# Patient Record
Sex: MALE | Race: WHITE | NOT HISPANIC OR LATINO | Employment: FULL TIME | URBAN - METROPOLITAN AREA
[De-identification: names, ages, dates, MRNs, and addresses within clinical notes are randomized per-mention and may not be internally consistent; named-entity substitution may affect disease eponyms.]

---

## 2017-01-09 ENCOUNTER — OFFICE VISIT (OUTPATIENT)
Dept: FAMILY MEDICINE CLINIC | Facility: CLINIC | Age: 32
End: 2017-01-09

## 2017-01-09 VITALS
HEART RATE: 95 BPM | TEMPERATURE: 97.9 F | OXYGEN SATURATION: 98 % | DIASTOLIC BLOOD PRESSURE: 86 MMHG | HEIGHT: 74 IN | BODY MASS INDEX: 40.43 KG/M2 | SYSTOLIC BLOOD PRESSURE: 122 MMHG | WEIGHT: 315 LBS

## 2017-01-09 DIAGNOSIS — L74.510 HYPERHIDROSIS OF AXILLA: Primary | ICD-10-CM

## 2017-01-09 PROCEDURE — 99213 OFFICE O/P EST LOW 20 MIN: CPT | Performed by: NURSE PRACTITIONER

## 2017-01-09 RX ORDER — OMEPRAZOLE 40 MG/1
40 CAPSULE, DELAYED RELEASE ORAL DAILY
COMMUNITY

## 2017-01-09 NOTE — MR AVS SNAPSHOT
Reg Brito   2017 8:15 AM   Office Visit    Provider:  PAULINO Woodson   Department:  [facility]   Dept Phone:  [phone]                Your Full Care Plan              Your Updated Medication List          This list is accurate as of: 17  8:32 AM.  Always use your most recent med list.                fexofenadine 180 MG tablet   Commonly known as:  ALLEGRA       ibuprofen 800 MG tablet   Commonly known as:  ADVIL,MOTRIN   Take 1 tablet by mouth every 6 (six) hours as needed for mild pain (1-3) or moderate pain (4-6).       MULTI VITAMIN DAILY PO       omeprazole 40 MG capsule   Commonly known as:  priLOSEC               We Performed the Following     Ambulatory Referral to Dermatology       You Were Diagnosed With        Codes Comments    Hyperhidrosis of axilla    -  Primary ICD-10-CM: L74.510  ICD-9-CM: 705.21     Pap smear, as part of routine gynecological examination     ICD-10-CM: Z01.419  ICD-9-CM: V76.2       Instructions     None    Patient Instructions History      [employer] Signup     [facility] allows you to send messages to your doctor, view your test results, renew your prescriptions, schedule appointments, and more. To sign up, go to [employer]etAnywhere and click on the Sign Up Now link in the New User? box. Enter your [facility] Activation Code exactly as it appears below along with the last four digits of your Social Security Number and your Date of Birth () to complete the sign-up process. If you do not sign up before the expiration date, you must request a new code.    [facility] Activation Code: [email or web address]  Expires: 2017  8:32 AM    If you have questions, you can email [email or web address] or call [phone] to talk to our [facility] staff. Remember, [facility] is NOT to be used for urgent needs. For medical emergencies, dial 911.               Other Info from Your Visit            "  Allergies     No Known Allergies      Reason for Visit     Skin Problem under Rt arm pit, derodorant not working, for some time      Vital Signs     Blood Pressure Pulse Temperature Height Weight Oxygen Saturation    122/86 95 97.9 °F (36.6 °C) 74\" (188 cm) 341 lb (155 kg) 98%    Body Mass Index Smoking Status                43.78 kg/m2 Never Smoker          Problems and Diagnoses Noted     Sweaty armpits    -  Primary    Screening for cervical cancer          "

## 2017-01-09 NOTE — PROGRESS NOTES
Subjective   Reg Brito is a 31 y.o. male.     History of Present Illness   Reg Brito 31 y.o. male presents for evaluation of irritation to right axillary area and complaint of excessive sweating to bilateral axillary areas. Patient states he has tried multiple OTC deoderants but has not found anything that controls his sweating. He reports that even with clinical strength products, he notices sweating within a couple of hours and odor.  He reports he has felt as if his right axillary area has been irritated for a couple of weeks but denies rash or pruritus.  He does not see dermatology but would be willing to.     The following portions of the patient's history were reviewed and updated as appropriate: allergies, current medications, past family history, past medical history, past social history, past surgical history and problem list.    Review of Systems   Skin: Negative for rash.       Objective   Physical Exam   Constitutional: He is oriented to person, place, and time. He appears well-developed and well-nourished.   HENT:   Head: Normocephalic and atraumatic.   Pulmonary/Chest: Effort normal.   Neurological: He is alert and oriented to person, place, and time.   Skin: Skin is warm, dry and intact. No rash noted.   Psychiatric: He has a normal mood and affect. Judgment normal.   Vitals reviewed.      Assessment/Plan   Reg was seen today for skin problem.    Diagnoses and all orders for this visit:    Hyperhidrosis of axilla  -     Ambulatory Referral to Dermatology

## 2018-02-19 ENCOUNTER — OFFICE VISIT (OUTPATIENT)
Dept: FAMILY MEDICINE CLINIC | Facility: CLINIC | Age: 33
End: 2018-02-19

## 2018-02-19 VITALS
WEIGHT: 315 LBS | OXYGEN SATURATION: 96 % | HEART RATE: 97 BPM | TEMPERATURE: 98.2 F | DIASTOLIC BLOOD PRESSURE: 80 MMHG | HEIGHT: 74 IN | RESPIRATION RATE: 18 BRPM | SYSTOLIC BLOOD PRESSURE: 132 MMHG | BODY MASS INDEX: 40.43 KG/M2

## 2018-02-19 DIAGNOSIS — H66.91 RIGHT OTITIS MEDIA, UNSPECIFIED OTITIS MEDIA TYPE: Primary | ICD-10-CM

## 2018-02-19 PROBLEM — H81.02 ENDOLYMPHATIC HYDROPS OF LEFT EAR: Status: ACTIVE | Noted: 2018-02-19

## 2018-02-19 PROCEDURE — 99213 OFFICE O/P EST LOW 20 MIN: CPT | Performed by: NURSE PRACTITIONER

## 2018-02-19 RX ORDER — CETIRIZINE HYDROCHLORIDE 10 MG/1
10 TABLET ORAL DAILY
COMMUNITY
Start: 2018-02-19

## 2018-02-19 RX ORDER — AMOXICILLIN 500 MG/1
500 CAPSULE ORAL 2 TIMES DAILY
Qty: 20 CAPSULE | Refills: 0 | Status: SHIPPED | OUTPATIENT
Start: 2018-02-19 | End: 2018-04-01

## 2018-02-19 RX ORDER — FLUTICASONE PROPIONATE 50 MCG
2 SPRAY, SUSPENSION (ML) NASAL DAILY
Qty: 1 BOTTLE | Refills: 11 | Status: SHIPPED | OUTPATIENT
Start: 2018-02-19 | End: 2018-06-08

## 2018-02-19 NOTE — PROGRESS NOTES
Subjective   Reg Brito is a 32 y.o. male.     History of Present Illness   Reg Brito 32 y.o. male who presents for evaluation of ear pressure. Symptoms include ear pressure and ear pain.  Onset of symptoms was 1 week ago, gradually worsening since that time. Patient denies shortness of breath, wheezing, fever.   Evaluation to date: none Treatment to date:  Allegra.  Reports having uri which has resolved but ear pressure and pain have worsened. Mainly on left side.     The following portions of the patient's history were reviewed and updated as appropriate: allergies, current medications, past family history, past medical history, past social history, past surgical history and problem list.    Review of Systems   Constitutional: Negative for chills and fever.   HENT: Positive for ear pain. Negative for congestion, ear discharge, postnasal drip, sinus pain and sinus pressure.    Respiratory: Negative for cough.        Objective   Physical Exam   Constitutional: He is oriented to person, place, and time. He appears well-developed and well-nourished.   HENT:   Right Ear: Tympanic membrane is erythematous.   Left Ear: External ear and ear canal normal. Tympanic membrane is bulging.   Pulmonary/Chest: Effort normal.   Neurological: He is oriented to person, place, and time.   Skin: Skin is warm and dry.   Psychiatric: He has a normal mood and affect. His behavior is normal. Judgment and thought content normal.   Nursing note and vitals reviewed.      Assessment/Plan   Reg was seen today for earache.    Diagnoses and all orders for this visit:    Right otitis media, unspecified otitis media type  -     amoxicillin (AMOXIL) 500 MG capsule; Take 1 capsule by mouth 2 (Two) Times a Day.    Other orders  -     fluticasone (FLONASE) 50 MCG/ACT nasal spray; 2 sprays into each nostril Daily.

## 2018-06-08 ENCOUNTER — OFFICE VISIT (OUTPATIENT)
Dept: FAMILY MEDICINE CLINIC | Facility: CLINIC | Age: 33
End: 2018-06-08

## 2018-06-08 VITALS
SYSTOLIC BLOOD PRESSURE: 131 MMHG | TEMPERATURE: 98.1 F | WEIGHT: 243 LBS | BODY MASS INDEX: 31.18 KG/M2 | DIASTOLIC BLOOD PRESSURE: 90 MMHG | HEART RATE: 92 BPM | RESPIRATION RATE: 18 BRPM | HEIGHT: 74 IN | OXYGEN SATURATION: 97 %

## 2018-06-08 DIAGNOSIS — L30.9 ECZEMA, UNSPECIFIED TYPE: Primary | ICD-10-CM

## 2018-06-08 PROCEDURE — 99213 OFFICE O/P EST LOW 20 MIN: CPT | Performed by: NURSE PRACTITIONER

## 2018-06-08 NOTE — PROGRESS NOTES
Subjective   Reg Brito is a 33 y.o. male.     History of Present Illness   Reg Brito 33 y.o. male presents for evaluation of a rash involving the left hand. Rash has been present for: a few months. Lesions are erythematous, and are described as patches. Rash has not changed over time. Rash itches. Associated symptoms  .none.  Patient denies:new rash areas..  Treatment to date includes: OTC Hydrocortisone cream and OTC triple antibiotic cream without improvement. Symptoms are stable. Patient has not had contacts with similar rash. Patient has not had new exposures (soaps, lotions, laundry detergents, foods, medications, plants, insects or animals).  States he normally gets similar areas on his hands in the winter months but it has not improved this time.     The following portions of the patient's history were reviewed and updated as appropriate: allergies, current medications, past family history, past medical history, past social history, past surgical history and problem list.    Review of Systems   Constitutional: Negative for chills, fatigue and fever.   Respiratory: Negative for cough and shortness of breath.    Cardiovascular: Negative for chest pain and palpitations.   Skin: Positive for rash.   Psychiatric/Behavioral: Negative for dysphoric mood and sleep disturbance. The patient is not nervous/anxious.        Objective   Physical Exam   Constitutional: He is oriented to person, place, and time. He appears well-developed and well-nourished.   Pulmonary/Chest: Effort normal.   Neurological: He is oriented to person, place, and time.   Skin: Skin is warm and dry. Rash noted. Rash is papular. There is erythema.        Scaly, papular eruption to left 3rd MCP area   Psychiatric: He has a normal mood and affect. His behavior is normal. Judgment and thought content normal.   Nursing note and vitals reviewed.      Assessment/Plan   Reg was seen today for rash.    Diagnoses and all orders for this  visit:    Eczema, unspecified type             Gave sample of eucrisa with instructions for use. Pt to call if he wants RX sent in. Given copay card

## 2018-06-29 ENCOUNTER — OFFICE VISIT (OUTPATIENT)
Dept: FAMILY MEDICINE CLINIC | Facility: CLINIC | Age: 33
End: 2018-06-29

## 2018-06-29 VITALS
BODY MASS INDEX: 40.43 KG/M2 | DIASTOLIC BLOOD PRESSURE: 86 MMHG | OXYGEN SATURATION: 97 % | SYSTOLIC BLOOD PRESSURE: 128 MMHG | HEART RATE: 83 BPM | HEIGHT: 74 IN | WEIGHT: 315 LBS | RESPIRATION RATE: 18 BRPM | TEMPERATURE: 98 F

## 2018-06-29 DIAGNOSIS — L30.9 ECZEMA, UNSPECIFIED TYPE: Primary | ICD-10-CM

## 2018-06-29 DIAGNOSIS — K52.9 GASTROENTERITIS: ICD-10-CM

## 2018-06-29 PROCEDURE — 99213 OFFICE O/P EST LOW 20 MIN: CPT | Performed by: NURSE PRACTITIONER

## 2018-06-29 NOTE — PROGRESS NOTES
Subjective   Reg Brito is a 33 y.o. male.     History of Present Illness   Reg Brito 33 y.o. male who presents for evaluation of diarrhea and reports having 1-2 stools in the last 24 hours. Symptoms have been present for 2 weeks .  The condition is aggravated by eating any food . he is experiencing fever  and chills.  Alleviating factors are not eating with some help, but still symptoms . Patient denies dyschezia, melena and bright red blood in stool, nausea or vomiting. his past medical history is notable for GERD and gastroenteritis a couple of months ago.  Patient denies recent travel.  Had fever of 103 for first couple of days.     Patient is very concerned about hepatitis A.      States eucrisa helped with eczema and would like RX.   The following portions of the patient's history were reviewed and updated as appropriate: allergies, current medications, past family history, past medical history, past social history, past surgical history and problem list.    Review of Systems   Constitutional: Negative for chills and fever.   Gastrointestinal: Positive for abdominal pain and diarrhea. Negative for abdominal distention, anal bleeding, blood in stool, constipation, nausea, rectal pain and vomiting.       Objective   Physical Exam   Constitutional: He is oriented to person, place, and time. He appears well-developed and well-nourished.   Pulmonary/Chest: Effort normal.   Abdominal: Normal appearance and bowel sounds are normal. There is no hepatosplenomegaly. There is no tenderness.   Neurological: He is oriented to person, place, and time.   Skin: Skin is warm and dry.   Psychiatric: He has a normal mood and affect. His behavior is normal. Judgment and thought content normal.   Nursing note and vitals reviewed.      Assessment/Plan   Reg was seen today for gi problem and diarrhea.    Diagnoses and all orders for this visit:    Eczema, unspecified type  -     Crisaborole 2 % ointment; Apply 1  application topically 2 (Two) Times a Day.    Gastroenteritis  -     Cancel: Hepatitis A Vaccine Adult IM  -     Hepatitis A Antibody, IgM          Discussed with patient that it was unlikely to be hepatitis A as symptoms are nearly resolved.  He would like to get checked before he gets the vaccine.

## 2018-06-30 LAB — HAV IGM SERPL QL IA: NEGATIVE

## 2018-07-11 ENCOUNTER — CLINICAL SUPPORT (OUTPATIENT)
Dept: FAMILY MEDICINE CLINIC | Facility: CLINIC | Age: 33
End: 2018-07-11

## 2018-07-11 DIAGNOSIS — Z23 IMMUNIZATION DUE: Primary | ICD-10-CM

## 2018-07-11 PROCEDURE — 90471 IMMUNIZATION ADMIN: CPT | Performed by: FAMILY MEDICINE

## 2018-07-11 PROCEDURE — 90632 HEPA VACCINE ADULT IM: CPT | Performed by: FAMILY MEDICINE

## 2022-05-19 ENCOUNTER — OFFICE (OUTPATIENT)
Dept: URBAN - METROPOLITAN AREA CLINIC 75 | Facility: CLINIC | Age: 37
End: 2022-05-19

## 2022-05-19 VITALS
DIASTOLIC BLOOD PRESSURE: 92 MMHG | SYSTOLIC BLOOD PRESSURE: 136 MMHG | HEIGHT: 74 IN | HEART RATE: 92 BPM | OXYGEN SATURATION: 99 % | WEIGHT: 315 LBS | TEMPERATURE: 96.7 F

## 2022-05-19 DIAGNOSIS — K62.5 HEMORRHAGE OF ANUS AND RECTUM: ICD-10-CM

## 2022-05-19 DIAGNOSIS — K64.9 UNSPECIFIED HEMORRHOIDS: ICD-10-CM

## 2022-05-19 DIAGNOSIS — K21.9 GASTRO-ESOPHAGEAL REFLUX DISEASE WITHOUT ESOPHAGITIS: ICD-10-CM

## 2022-05-19 PROCEDURE — 99204 OFFICE O/P NEW MOD 45 MIN: CPT | Performed by: INTERNAL MEDICINE

## 2022-05-19 RX ORDER — HYDROCORTISONE 25 MG/G
OINTMENT TOPICAL
Qty: 30 | Refills: 3 | Status: ACTIVE
Start: 2022-05-19

## 2022-05-19 RX ORDER — HYDROCORTISONE ACETATE 25 MG/1
SUPPOSITORY RECTAL
Qty: 15 | Refills: 2 | Status: ACTIVE
Start: 2022-05-19

## 2024-04-02 ENCOUNTER — OFFICE VISIT (OUTPATIENT)
Dept: FAMILY MEDICINE CLINIC | Facility: CLINIC | Age: 39
End: 2024-04-02
Payer: COMMERCIAL

## 2024-04-02 VITALS
DIASTOLIC BLOOD PRESSURE: 98 MMHG | HEIGHT: 74 IN | SYSTOLIC BLOOD PRESSURE: 142 MMHG | BODY MASS INDEX: 40.43 KG/M2 | OXYGEN SATURATION: 97 % | WEIGHT: 315 LBS | HEART RATE: 102 BPM

## 2024-04-02 DIAGNOSIS — Z00.00 ANNUAL PHYSICAL EXAM: Primary | ICD-10-CM

## 2024-04-02 DIAGNOSIS — K21.9 GASTROESOPHAGEAL REFLUX DISEASE WITHOUT ESOPHAGITIS: Chronic | ICD-10-CM

## 2024-04-02 DIAGNOSIS — Z87.2 HISTORY OF CELLULITIS: ICD-10-CM

## 2024-04-02 DIAGNOSIS — I10 PRIMARY HYPERTENSION: Chronic | ICD-10-CM

## 2024-04-02 DIAGNOSIS — Z13.220 SCREENING FOR LIPID DISORDERS: ICD-10-CM

## 2024-04-02 DIAGNOSIS — J30.89 ENVIRONMENTAL AND SEASONAL ALLERGIES: Chronic | ICD-10-CM

## 2024-04-02 DIAGNOSIS — Z11.59 ENCOUNTER FOR HEPATITIS C SCREENING TEST FOR LOW RISK PATIENT: ICD-10-CM

## 2024-04-02 DIAGNOSIS — E66.01 MORBID (SEVERE) OBESITY DUE TO EXCESS CALORIES: Chronic | ICD-10-CM

## 2024-04-02 DIAGNOSIS — Z13.1 SCREENING FOR DIABETES MELLITUS (DM): ICD-10-CM

## 2024-04-02 DIAGNOSIS — B00.50 HERPES SIMPLEX OF EYE: Chronic | ICD-10-CM

## 2024-04-02 DIAGNOSIS — Z13.21 ENCOUNTER FOR VITAMIN DEFICIENCY SCREENING: ICD-10-CM

## 2024-04-02 DIAGNOSIS — H61.23 BILATERAL IMPACTED CERUMEN: ICD-10-CM

## 2024-04-02 RX ORDER — LISINOPRIL 10 MG/1
10 TABLET ORAL DAILY
Qty: 30 TABLET | Refills: 1 | Status: SHIPPED | OUTPATIENT
Start: 2024-04-02

## 2024-04-02 RX ORDER — ACYCLOVIR 400 MG/1
400 TABLET ORAL
COMMUNITY

## 2024-04-02 RX ORDER — FEXOFENADINE HCL 180 MG/1
180 TABLET ORAL DAILY
COMMUNITY

## 2024-04-02 NOTE — PROGRESS NOTES
Male Physical Note      Date: 2024   Patient Name: Reg Brito  : 1985   MRN: [id number]     Chief Complaint   Patient presents with    Annual Exam     Pt is here today for annual exam along with blood work     Elevated Blood Pressure     Pt states in the past year he's noticed his blood pressure has started to be elevated, Pt states this is possibly due to lack of fitness    Leg Pain     Pt states he has noticed discoloration on both of his legs       History of Present Illness: Reg Brito is a 38 y.o. male who is here today for their annual health maintenance and physical.      Subjective      Review of Systems    Past Medical History, Social History, Family History and Care Team were all reviewed with patient and updated as appropriate.       Current Outpatient Medications:     acyclovir (ZOVIRAX) 400 MG tablet, Take 1 tablet by mouth Every 4 (Four) Hours While Awake. Take no more than 5 doses a day., Disp: , Rfl:     esomeprazole (nexIUM) 20 MG capsule, Take 1 capsule by mouth Every Morning Before Breakfast., Disp: , Rfl:     fexofenadine (ALLEGRA) 180 MG tablet, Take 1 tablet by mouth Daily., Disp: , Rfl:     Multiple Vitamin (MULTI VITAMIN DAILY PO), Take  by mouth., Disp: , Rfl:     lisinopril (PRINIVIL,ZESTRIL) 10 MG tablet, Take 1 tablet by mouth Daily., Disp: 30 tablet, Rfl: 1    Allergies   Allergen Reactions    Nuts Other (See Comments)     Tree nuts via allergy testing, was not exposed, has always avoided    Shellfish-Derived Products Nausea And Vomiting     Allergy testing confirmed allergy       Immunization History   Administered Date(s) Administered    Hepatitis A 2018, 2019    Tdap 2016        Health Maintenance Summary            Ordered - HEPATITIS C SCREENING (Once) Ordered on 2024      No completion, postpone, or frequency change history exists for this topic.              Overdue - COVID-19 Vaccine (2023- season) Never done      No  "completion, postpone, or frequency change history exists for this topic.              INFLUENZA VACCINE (Yearly - August to March) Next due on 8/1/2024 02/19/2018  Declined    01/09/2017  Declined              ANNUAL PHYSICAL (Yearly) Next due on 4/2/2025 04/02/2024  Done    06/29/2018  Postponed until 12/31/2018 by Milagro Boyer MA (Pending event)              BMI FOLLOWUP (Yearly) Next due on 4/2/2025 04/02/2024  [employer]: WORKFLOW - QUALITY MEASUREMENT - DOCUMENTED WEIGHT FOLLOW-UP PLAN              TDAP/TD VACCINES (2 - Td or Tdap) Next due on 5/13/2026 05/13/2016  Imm Admin: Tdap              Pneumococcal Vaccine 0-64 (Series Information) Aged Out      No completion, postpone, or frequency change history exists for this topic.                    Colorectal Screening:   never, negative family hx   Last Completed Colonoscopy       This patient has no relevant Health Maintenance data.          Hep C (Age 18-79 once):  screening lab drawn    A1c: No results found for: \"HGBA1C\"  Lipid panel: No results found for: \"LIPIDEXCLUSI\"    The ASCVD Risk score (Thomas DK, et al., 2019) failed to calculate for the following reasons:    The 2019 ASCVD risk score is only valid for ages 40 to 79    Dermatology: does not see regularly, no concerns/complaints  Ophthalmologist: sees irregularly, typically only when he needs glasses  Dentist: twice yearly exams and cleanings    Tobacco Use: Low Risk  (4/2/2024)    Patient History     Smoking Tobacco Use: Never     Smokeless Tobacco Use: Never     Passive Exposure: Not on file       Social History     Substance and Sexual Activity   Alcohol Use No        Social History     Substance and Sexual Activity   Drug Use No        Diet/Physical activity: patient report very unhealthy diet over the past few years, but within the past 2-3 months he is making healthier choices. Fast food 2-3 times per week, soft drink maybe once a week. Reports he does have a sweet " "tooth but he is working on it. Has frequented the gym for regular workouts but after his twins were born (16 months ago) he hasn't been going to the gym    Sexual health:  Social History     Substance and Sexual Activity   Sexual Activity Yes    Partners: Female       PHQ-2 Depression Screening  PHQ-9 Total Score: 0       Intimate partner violence: (Screen on initial visit, older adult with injury or evidence of neglect):  Violence can be a problem in many people's lives, so I now ask every patient about trauma or abuse they may have experienced in a relationship.  Stress/Safety - Do you feel safe in your relationship?  Afraid/Abused - Have you ever been in a relationship where you were threatened, hurt, or afraid?  Friend/Family - Are your friends aware you have been hurt?  Emergency Plan - Do you have a safe place to go and the resources you need in an emergency?    Osteoporosis:   Men: history of low trauma fracture, androgen deprivation therapy for prostate cancer, hypogonadism, primary hyperparathyroidism, intestinal disorders.     Objective     Physical Exam:  Vitals:    04/02/24 1513   BP: 142/98   BP Location: Left arm   Patient Position: Sitting   Cuff Size: Adult   Pulse: 102   SpO2: 97%   Weight: (!) 182 kg (400 lb 9.6 oz)   Height: 188 cm (74\")     Body mass index is 51.43 kg/m².     Physical Exam  Vitals reviewed.   Constitutional:       Appearance: Normal appearance. He is obese.   HENT:      Head: Normocephalic.      Right Ear: External ear normal. There is impacted cerumen.      Left Ear: External ear normal. There is impacted cerumen.      Nose: Nose normal.      Mouth/Throat:      Mouth: Mucous membranes are moist.      Pharynx: Oropharynx is clear.   Eyes:      Extraocular Movements: Extraocular movements intact.      Pupils: Pupils are equal, round, and reactive to light.   Cardiovascular:      Rate and Rhythm: Normal rate and regular rhythm.      Heart sounds: Normal heart sounds.   Pulmonary: "      Effort: Pulmonary effort is normal.      Breath sounds: Normal breath sounds.   Musculoskeletal:         General: Normal range of motion.      Cervical back: Normal range of motion.      Right lower leg: Edema (mild) present.      Left lower leg: Edema (mild) present.   Skin:     General: Skin is warm and dry.      Capillary Refill: Capillary refill takes less than 2 seconds.   Neurological:      General: No focal deficit present.      Mental Status: He is alert and oriented to person, place, and time.   Psychiatric:         Mood and Affect: Mood normal.         Behavior: Behavior normal.         Procedures    Assessment / Plan      Assessment/Plan:   Diagnoses and all orders for this visit:    1. Annual physical exam (Primary)  Assessment & Plan:  Overall doing well   with 16-month-old twin boys  Wife is currently pregnant, due in October - unknown gender  Works from home, IT support    Orders:  -     CBC & Differential  -     Comprehensive Metabolic Panel  -     Hemoglobin A1c  -     Lipid Panel  -     TSH  -     Vitamin B12  -     Vitamin D,25-Hydroxy  -     Folate  -     T4, free    2. History of cellulitis  Assessment & Plan:  Patient reports he was diagnosed with bilateral lower leg cellulitis in December 2023. He was prescribed oral antibiotics (Amoxicillin, he thinks) which resolved the infection. He has not had recurrence of infection since then, but does note lower leg swelling and discomfort at the end of the day - we discussed dependent edema and venous stasis as possible causes. Patient declines further evaluation at this time, is agreeable to wearing compression stockings while sitting at his desk for work.         3. Primary hypertension  Assessment & Plan:  Patient has new onset Hypertension  Ambulatory BP monitoring  Medication changes per orders.  Dietary sodium restriction.  Recommended strategies for weight loss.  Counseled on importance of healthy eating and regular aerobic  exercise  Advised to check BP regularly and call office if frequently >140/90  Blood pressure will be reassessed in 4 weeks.    Discussed medication options, risks/benefits, and possible side effects. Will initiate Lisinopril today, monitor BP at home, return in 3-4 weeks for recheck.     Orders:  -     lisinopril (PRINIVIL,ZESTRIL) 10 MG tablet; Take 1 tablet by mouth Daily.  Dispense: 30 tablet; Refill: 1    4. Gastroesophageal reflux disease without esophagitis  Assessment & Plan:  Stable, takes Esomeprazole daily      5. Environmental and seasonal allergies  Assessment & Plan:  Stable, takes Allegra daily      6. Herpes simplex of eye  Assessment & Plan:  Patient reports this was diagnosed by ophthalmologist years ago, he takes 400mg Acyclovir as needed when he has a flare of symptoms.       7. Screening for lipid disorders  Assessment & Plan:  Labs drawn today, not fasting    Orders:  -     Lipid Panel    8. Screening for diabetes mellitus (DM)  Assessment & Plan:  Labs drawn today    Orders:  -     Comprehensive Metabolic Panel  -     Hemoglobin A1c    9. Encounter for vitamin deficiency screening  Assessment & Plan:  Labs drawn today    Orders:  -     Vitamin B12  -     Vitamin D,25-Hydroxy  -     Folate    10. Encounter for hepatitis C screening test for low risk patient  Assessment & Plan:  Labs drawn today    Orders:  -     Hepatitis C Antibody    11. Bilateral impacted cerumen  Assessment & Plan:  Patient reports he has been told he has an overabundance of ear wax. He has been using Debrox intermittently without improvement. He does use Qtips. Discussed and encouraged regular use of Debrox or sweet oil into the ears to soften the wax, will perform cerumen removal at next appointment      12. Morbid (severe) obesity due to excess calories         Vaccine Counseling:      Healthcare Maintenance:  Counseling provided based on age appropriate USPSTF guidelines.  Class 3 Severe Obesity (BMI >=40).  Obesity-related health conditions include the following: hypertension and screening labs drawn today to assess for other conditions . Obesity is unchanged. BMI is is above average; BMI management plan is completed. We discussed portion control and increasing exercise.      Advanced Care Planning:   Patient does not have an advance directive, declines further assistance.    Smoking Cessation:   Not a smoker    Reg Brito voices understanding and acceptance of this advice and will call back with any further questions or concerns. AVS with preventive healthcare tips printed for patient.     Follow Up:   Return in about 4 weeks (around 4/30/2024) for Recheck.        PAULINO Ribera

## 2024-04-02 NOTE — ASSESSMENT & PLAN NOTE
Patient reports this was diagnosed by ophthalmologist years ago, he takes 400mg Acyclovir as needed when he has a flare of symptoms.

## 2024-04-02 NOTE — ASSESSMENT & PLAN NOTE
Patient has new onset Hypertension  Ambulatory BP monitoring  Medication changes per orders.  Dietary sodium restriction.  Recommended strategies for weight loss.  Counseled on importance of healthy eating and regular aerobic exercise  Advised to check BP regularly and call office if frequently >140/90  Blood pressure will be reassessed in 4 weeks.    Discussed medication options, risks/benefits, and possible side effects. Will initiate Lisinopril today, monitor BP at home, return in 3-4 weeks for recheck.

## 2024-04-02 NOTE — ASSESSMENT & PLAN NOTE
Patient reports he was diagnosed with bilateral lower leg cellulitis in December 2023. He was prescribed oral antibiotics (Amoxicillin, he thinks) which resolved the infection. He has not had recurrence of infection since then, but does note lower leg swelling and discomfort at the end of the day - we discussed dependent edema and venous stasis as possible causes. Patient declines further evaluation at this time, is agreeable to wearing compression stockings while sitting at his desk for work.

## 2024-04-02 NOTE — ASSESSMENT & PLAN NOTE
Overall doing well   with 16-month-old twin boys  Wife is currently pregnant, due in October - unknown gender  Works from home, IT support

## 2024-04-02 NOTE — ASSESSMENT & PLAN NOTE
Patient reports he has been told he has an overabundance of ear wax. He has been using Debrox intermittently without improvement. He does use Qtips. Discussed and encouraged regular use of Debrox or sweet oil into the ears to soften the wax, will perform cerumen removal at next appointment

## 2024-04-02 NOTE — PATIENT INSTRUCTIONS
Obesity, Adult  Obesity is the condition of having too much total body fat. Being overweight or obese means that your weight is greater than what is considered healthy for your body size. Obesity is determined by a measurement called BMI (body mass index). BMI is an estimate of body fat and is calculated from height and weight. For adults, a BMI of 30 or higher is considered obese.  Obesity can lead to other health concerns and major illnesses, including:  Stroke.  Coronary artery disease (CAD).  Type 2 diabetes.  Some types of cancer, including cancers of the colon, breast, uterus, and gallbladder.  High blood pressure (hypertension).  High cholesterol.  Gallbladder stones.  Obesity can also contribute to:  Osteoarthritis.  Sleep apnea.  Infertility problems.  What are the causes?  Common causes of this condition include:  Eating daily meals that are high in calories, sugar, and fat.  Drinking high amounts of sugar-sweetened beverages, such as soft drinks.  Being born with genes that may make you more likely to become obese.  Having a medical condition that causes obesity, including:  Hypothyroidism.  Polycystic ovarian syndrome (PCOS).  Binge-eating disorder.  Cushing syndrome.  Taking certain medicines, such as steroids, antidepressants, and seizure medicines.  Not being physically active (sedentary lifestyle).  Not getting enough sleep.  What increases the risk?  The following factors may make you more likely to develop this condition:  Having a family history of obesity.  Living in an area with limited access to:  [place], recreation centers, or sidewalks.  Healthy food choices, such as grocery stores and [employer] markets.  What are the signs or symptoms?  The main sign of this condition is having too much body fat.  How is this diagnosed?  This condition is diagnosed based on:  Your BMI. If you are an adult with a BMI of 30 or higher, you are considered obese.  Your waist circumference. This measures the  distance around your waistline.  Your skinfold thickness. Your health care provider may gently pinch a fold of your skin and measure it.  You may have other tests to check for underlying conditions.  How is this treated?  Treatment for this condition often includes changing your lifestyle. Treatment may include some or all of the following:  Dietary changes. This may include developing a healthy meal plan.  Regular physical activity. This may include activity that causes your heart to beat faster (aerobic exercise) and strength training. Work with your health care provider to design an exercise program that works for you.  Medicine to help you lose weight if you are unable to lose one pound a week after six weeks of healthy eating and more physical activity.  Treating conditions that cause the obesity (underlying conditions).  Surgery. Surgical options may include gastric banding and gastric bypass. Surgery may be done if:  Other treatments have not helped to improve your condition.  You have a BMI of 40 or higher.  You have life-threatening health problems related to obesity.  Follow these instructions at home:  Eating and drinking    Follow recommendations from your health care provider about what you eat and drink. Your health care provider may advise you to:  Limit fast food, sweets, and processed snack foods.  Choose low-fat options, such as low-fat milk instead of whole milk.  Eat five or more servings of fruits or vegetables every day.  Choose healthy foods when you eat out.  Keep low-fat snacks available.  Limit sugary drinks, such as soda, fruit juice, sweetened iced tea, and flavored milk.  Drink enough water to keep your urine pale yellow.  Do not follow a fad diet. Fad diets can be unhealthy and even dangerous.  Other healthful choices include:  Eat at home more often. This gives you more control over what you eat.  Learn to read food labels. This will help you understand how much food is considered one  serving.  Learn what a healthy serving size is.  Physical activity  Exercise regularly, as told by your health care provider.  Most adults should get up to 150 minutes of moderate-intensity exercise every week.  Ask your health care provider what types of exercise are safe for you and how often you should exercise.  Warm up and stretch before being active.  Cool down and stretch after being active.  Rest between periods of activity.  Lifestyle  Work with your health care provider and a dietitian to set a weight-loss goal that is healthy and reasonable for you.  Limit your screen time.  Find ways to reward yourself that do not involve food.  Do not drink alcohol if:  Your health care provider tells you not to drink.  You are pregnant, may be pregnant, or are planning to become pregnant.  If you drink alcohol:  Limit how much you have to:  0-1 drink a day for women.  0-2 drinks a day for men.  Know how much alcohol is in your drink. In the [place] one drink equals one 12 oz bottle of beer (355 mL), one 5 oz glass of wine (148 mL), or one 1½ oz glass of hard liquor (44 mL).  General instructions  Keep a weight-loss journal to keep track of the food you eat and how much exercise you get.  Take over-the-counter and prescription medicines only as told by your health care provider.  Take vitamins and supplements only as told by your health care provider.  Consider joining a support group. Your health care provider may be able to recommend a support group.  Pay attention to your mental health as obesity can lead to depression or self esteem issues.  Keep all follow-up visits. This is important.  Contact a health care provider if:  You are unable to meet your weight-loss goal after six weeks of dietary and lifestyle changes.  You have trouble breathing.  Summary  Obesity is the condition of having too much total body fat.  Being overweight or obese means that your weight is greater than what is considered healthy for your body  size.  Work with your health care provider and a dietitian to set a weight-loss goal that is healthy and reasonable for you.  Exercise regularly, as told by your health care provider. Ask your health care provider what types of exercise are safe for you and how often you should exercise.  This information is not intended to replace advice given to you by your health care provider. Make sure you discuss any questions you have with your health care provider.  Document Revised: 07/26/2022 Document Reviewed: 07/26/2022  [employer] Patient Education © 2023 [employer]    Exercising to Lose Weight  Getting regular exercise is important for everyone. It is especially important if you are overweight. Being overweight increases your risk of heart disease, stroke, diabetes, high blood pressure, and several types of cancer. Exercising, and reducing the calories you consume, can help you lose weight and improve fitness and health.  Exercise can be moderate or vigorous intensity. To lose weight, most people need to do a certain amount of moderate or vigorous-intensity exercise each week.  How can exercise affect me?  You lose weight when you exercise enough to burn more calories than you eat. Exercise also reduces body fat and builds muscle. The more muscle you have, the more calories you burn. Exercise also:  Improves mood.  Reduces stress and tension.  Improves your overall fitness, flexibility, and endurance.  Increases bone strength.  Moderate-intensity exercise    Moderate-intensity exercise is any activity that gets you moving enough to burn at least three times more energy (calories) than if you were sitting.  Examples of moderate exercise include:  Walking a mile in 15 minutes.  Doing light yard work.  Biking at an easy pace.  Most people should get at least 150 minutes of moderate-intensity exercise a week to maintain their body weight.  Vigorous-intensity exercise  Vigorous-intensity exercise is any activity that gets  you moving enough to burn at least six times more calories than if you were sitting. When you exercise at this intensity, you should be working hard enough that you are not able to carry on a conversation.  Examples of vigorous exercise include:  Running.  Playing a team sport, such as football, basketball, and soccer.  Jumping rope.  Most people should get at least 75 minutes a week of vigorous exercise to maintain their body weight.  What actions can I take to lose weight?  The amount of exercise you need to lose weight depends on:  Your age.  The type of exercise.  Any health conditions you have.  Your overall physical ability.  Talk to your health care provider about how much exercise you need and what types of activities are safe for you.  Nutrition    Make changes to your diet as told by your health care provider or diet and nutrition specialist (dietitian). This may include:  Eating fewer calories.  Eating more protein.  Eating less unhealthy fats.  Eating a diet that includes fresh fruits and vegetables, whole grains, low-fat dairy products, and lean protein.  Avoiding foods with added fat, salt, and sugar.  Drink plenty of water while you exercise to prevent dehydration or heat stroke.  Activity  Choose an activity that you enjoy and set realistic goals. Your health care provider can help you make an exercise plan that works for you.  Exercise at a moderate or vigorous intensity most days of the week.  The intensity of exercise may vary from person to person. You can tell how intense a workout is for you by paying attention to your breathing and heartbeat. Most people will notice their breathing and heartbeat get faster with more intense exercise.  Do resistance training twice each week, such as:  Push-ups.  Sit-ups.  Lifting weights.  Using resistance bands.  Getting short amounts of exercise can be just as helpful as long, structured periods of exercise. If you have trouble finding time to exercise, try  doing these things as part of your daily routine:  Get up, stretch, and walk around every 30 minutes throughout the day.  Go for a walk during your lunch break.  Park your car farther away from your destination.  If you take public transportation, get off one stop early and walk the rest of the way.  Make phone calls while standing up and walking around.  Take the stairs instead of elevators or escalators.  Wear comfortable clothes and shoes with good support.  Do not exercise so much that you hurt yourself, feel dizzy, or get very short of breath.  Where to find more information  [place] Department of Health and Human Services: www.hhs.gov  Centers for Disease Control and Prevention: www.cdc.gov  Contact a health care provider:  Before starting a new exercise program.  If you have questions or concerns about your weight.  If you have a medical problem that keeps you from exercising.  Get help right away if:  You have any of the following while exercising:  Injury.  Dizziness.  Difficulty breathing or shortness of breath that does not go away when you stop exercising.  Chest pain.  Rapid heartbeat.  These symptoms may represent a serious problem that is an emergency. Do not wait to see if the symptoms will go away. Get medical help right away. Call your local emergency services (911 in the [place] Do not drive yourself to the hospital.  Summary  Getting regular exercise is especially important if you are overweight.  Being overweight increases your risk of heart disease, stroke, diabetes, high blood pressure, and several types of cancer.  Losing weight happens when you burn more calories than you eat.  Reducing the amount of calories you eat, and getting regular moderate or vigorous exercise each week, helps you lose weight.  This information is not intended to replace advice given to you by your health care provider. Make sure you discuss any questions you have with your health care provider.  Document Revised:  02/13/2022 Document Reviewed: 02/13/2022  [employer] Patient Education © 2023 [employer]

## 2024-04-03 LAB
25(OH)D3+25(OH)D2 SERPL-MCNC: 19.7 NG/ML (ref 30–100)
ALBUMIN SERPL-MCNC: 4.4 G/DL (ref 4.1–5.1)
ALBUMIN/GLOB SERPL: 1.6 {RATIO} (ref 1.2–2.2)
ALP SERPL-CCNC: 75 IU/L (ref 44–121)
ALT SERPL-CCNC: 35 IU/L (ref 0–44)
AST SERPL-CCNC: 23 IU/L (ref 0–40)
BASOPHILS # BLD AUTO: 0.1 X10E3/UL (ref 0–0.2)
BASOPHILS NFR BLD AUTO: 1 %
BILIRUB SERPL-MCNC: 0.4 MG/DL (ref 0–1.2)
BUN SERPL-MCNC: 10 MG/DL (ref 6–20)
BUN/CREAT SERPL: 10 (ref 9–20)
CALCIUM SERPL-MCNC: 9.2 MG/DL (ref 8.7–10.2)
CHLORIDE SERPL-SCNC: 102 MMOL/L (ref 96–106)
CHOLEST SERPL-MCNC: 143 MG/DL (ref 100–199)
CO2 SERPL-SCNC: 22 MMOL/L (ref 20–29)
CREAT SERPL-MCNC: 0.98 MG/DL (ref 0.76–1.27)
EGFRCR SERPLBLD CKD-EPI 2021: 102 ML/MIN/1.73
EOSINOPHIL # BLD AUTO: 0.2 X10E3/UL (ref 0–0.4)
EOSINOPHIL NFR BLD AUTO: 3 %
ERYTHROCYTE [DISTWIDTH] IN BLOOD BY AUTOMATED COUNT: 14.2 % (ref 11.6–15.4)
FOLATE SERPL-MCNC: 19.6 NG/ML
GLOBULIN SER CALC-MCNC: 2.8 G/DL (ref 1.5–4.5)
GLUCOSE SERPL-MCNC: 106 MG/DL (ref 70–99)
HBA1C MFR BLD: 6.2 % (ref 4.8–5.6)
HCT VFR BLD AUTO: 43.6 % (ref 37.5–51)
HCV IGG SERPL QL IA: NON REACTIVE
HDLC SERPL-MCNC: 23 MG/DL
HGB BLD-MCNC: 14.1 G/DL (ref 13–17.7)
IMM GRANULOCYTES # BLD AUTO: 0 X10E3/UL (ref 0–0.1)
IMM GRANULOCYTES NFR BLD AUTO: 0 %
LDLC SERPL CALC-MCNC: 94 MG/DL (ref 0–99)
LYMPHOCYTES # BLD AUTO: 2.3 X10E3/UL (ref 0.7–3.1)
LYMPHOCYTES NFR BLD AUTO: 38 %
MCH RBC QN AUTO: 24.7 PG (ref 26.6–33)
MCHC RBC AUTO-ENTMCNC: 32.3 G/DL (ref 31.5–35.7)
MCV RBC AUTO: 76 FL (ref 79–97)
MONOCYTES # BLD AUTO: 0.5 X10E3/UL (ref 0.1–0.9)
MONOCYTES NFR BLD AUTO: 9 %
NEUTROPHILS # BLD AUTO: 3.1 X10E3/UL (ref 1.4–7)
NEUTROPHILS NFR BLD AUTO: 49 %
PLATELET # BLD AUTO: 269 X10E3/UL (ref 150–450)
POTASSIUM SERPL-SCNC: 4.3 MMOL/L (ref 3.5–5.2)
PROT SERPL-MCNC: 7.2 G/DL (ref 6–8.5)
RBC # BLD AUTO: 5.71 X10E6/UL (ref 4.14–5.8)
SODIUM SERPL-SCNC: 139 MMOL/L (ref 134–144)
T4 FREE SERPL-MCNC: 0.84 NG/DL (ref 0.82–1.77)
TRIGL SERPL-MCNC: 147 MG/DL (ref 0–149)
TSH SERPL DL<=0.005 MIU/L-ACNC: 3.3 UIU/ML (ref 0.45–4.5)
VIT B12 SERPL-MCNC: 392 PG/ML (ref 232–1245)
VLDLC SERPL CALC-MCNC: 26 MG/DL (ref 5–40)
WBC # BLD AUTO: 6.2 X10E3/UL (ref 3.4–10.8)

## 2024-05-02 ENCOUNTER — OFFICE VISIT (OUTPATIENT)
Dept: FAMILY MEDICINE CLINIC | Facility: CLINIC | Age: 39
End: 2024-05-02
Payer: COMMERCIAL

## 2024-05-02 VITALS
WEIGHT: 315 LBS | BODY MASS INDEX: 40.43 KG/M2 | DIASTOLIC BLOOD PRESSURE: 80 MMHG | SYSTOLIC BLOOD PRESSURE: 138 MMHG | OXYGEN SATURATION: 97 % | HEIGHT: 74 IN | HEART RATE: 94 BPM

## 2024-05-02 DIAGNOSIS — I10 PRIMARY HYPERTENSION: Primary | Chronic | ICD-10-CM

## 2024-05-02 DIAGNOSIS — R73.03 PREDIABETES: ICD-10-CM

## 2024-05-02 DIAGNOSIS — G89.29 CHRONIC LEFT-SIDED LOW BACK PAIN WITH LEFT-SIDED SCIATICA: ICD-10-CM

## 2024-05-02 DIAGNOSIS — M54.42 CHRONIC LEFT-SIDED LOW BACK PAIN WITH LEFT-SIDED SCIATICA: ICD-10-CM

## 2024-05-02 PROCEDURE — 99214 OFFICE O/P EST MOD 30 MIN: CPT | Performed by: NURSE PRACTITIONER

## 2024-05-02 RX ORDER — LISINOPRIL 10 MG/1
10 TABLET ORAL DAILY
Qty: 90 TABLET | Refills: 1 | Status: SHIPPED | OUTPATIENT
Start: 2024-05-02

## 2024-05-02 NOTE — PROGRESS NOTES
"    Office Note     Name: Reg Brito    : 1985     MRN: [id number]     Chief Complaint  Hypertension (Pt is here for a recheck for his BP. Pt states his Bp has been running 120s/80s )    Subjective     History of Present Illness:  Reg Brito is a 39 y.o. male who presents today for follow-up on HTN medication initiation. He also wants to discuss previous back injury and sciatica pains.       Objective     Past Medical History:   Diagnosis Date    Allergic      Past Surgical History:   Procedure Laterality Date    TONSILLECTOMY       Family History   Problem Relation Age of Onset    Hypertension Mother     Hypertension Father        Vital Signs  /80 (BP Location: Left arm, Patient Position: Sitting, Cuff Size: Adult)   Pulse 94   Ht 188 cm (74\")   Wt (!) 180 kg (396 lb 14.4 oz)   SpO2 97%   BMI 50.96 kg/m²   Estimated body mass index is 50.96 kg/m² as calculated from the following:    Height as of this encounter: 188 cm (74\").    Weight as of this encounter: 180 kg (396 lb 14.4 oz).    Physical Exam  Vitals reviewed.   Constitutional:       Appearance: Normal appearance.   Cardiovascular:      Rate and Rhythm: Normal rate and regular rhythm.      Heart sounds: Normal heart sounds.   Pulmonary:      Effort: Pulmonary effort is normal.      Breath sounds: Normal breath sounds.   Musculoskeletal:      Comments: Discussed with patient hiss lower back pain and sciatica - denies current flare of symptoms, reports he feels fine currently.    Skin:     General: Skin is warm and dry.      Capillary Refill: Capillary refill takes less than 2 seconds.   Neurological:      General: No focal deficit present.      Mental Status: He is alert and oriented to person, place, and time.   Psychiatric:         Mood and Affect: Mood normal.         Behavior: Behavior normal.             Assessment and Plan     Diagnoses and all orders for this visit:    1. Primary hypertension (Primary)  Assessment & " "Plan:  Hypertension is stable and controlled  Continue current treatment regimen.  Dietary sodium restriction.  Weight loss.  Regular aerobic exercise.  Blood pressure will be reassessed in 6 months.    Overall doing well, feels good, denies side effects. Monitoring BP at home, 's-120's and DBP 70's-80's. Will continue current dose, return to the office in November    Orders:  -     lisinopril (PRINIVIL,ZESTRIL) 10 MG tablet; Take 1 tablet by mouth Daily.  Dispense: 90 tablet; Refill: 1    2. Prediabetes  Assessment & Plan:  Patient was found to have prediabetes on labs drawn last month. He reports he has not had time to make major changes to his diet as he and his family have been very busy and traveled to [place] to see family. We discussed healthy diet and exercise, patient report he will make a better effort to initiate these changes. We will see back in November and recheck labs then.       3. Chronic left-sided low back pain with left-sided sciatica  Assessment & Plan:  Patient reports he had a fall at work years ago where he fell down some stairs and struck his left buttock and lower back. He had sciatica pain immediately following the fall but reports he was younger and more physically active so he was able to compensate and recover quickly. He reports since then, and more frequently as he's gotten older, he has had intermittent bouts of left lower back pain with shooting \"pins and needles\" and burning pain that travels all the way down to his pinkie toe.     We discussed steps for evaluation including imaging and specialists. He would like to delay this for now. He reports these symptoms are more of an annoyance than anything and only happen if he overuses his back or left leg and/or stands for hours at a time. We discussed importance of evaluation before he becomes miserable and debilitated, as the evaluation process takes time - patient verbalized understanding.           Follow Up  Return in about 6 " months (around 11/2/2024) for Recheck.    Magnolia Mcneil, APRN

## 2024-05-02 NOTE — ASSESSMENT & PLAN NOTE
Patient was found to have prediabetes on labs drawn last month. He reports he has not had time to make major changes to his diet as he and his family have been very busy and traveled to [place] to see family. We discussed healthy diet and exercise, patient report he will make a better effort to initiate these changes. We will see back in November and recheck labs then.

## 2024-05-02 NOTE — ASSESSMENT & PLAN NOTE
"Patient reports he had a fall at work years ago where he fell down some stairs and struck his left buttock and lower back. He had sciatica pain immediately following the fall but reports he was younger and more physically active so he was able to compensate and recover quickly. He reports since then, and more frequently as he's gotten older, he has had intermittent bouts of left lower back pain with shooting \"pins and needles\" and burning pain that travels all the way down to his pinkie toe.     We discussed steps for evaluation including imaging and specialists. He would like to delay this for now. He reports these symptoms are more of an annoyance than anything and only happen if he overuses his back or left leg and/or stands for hours at a time. We discussed importance of evaluation before he becomes miserable and debilitated, as the evaluation process takes time - patient verbalized understanding.   "

## 2024-05-02 NOTE — ASSESSMENT & PLAN NOTE
Hypertension is stable and controlled  Continue current treatment regimen.  Dietary sodium restriction.  Weight loss.  Regular aerobic exercise.  Blood pressure will be reassessed in 6 months.    Overall doing well, feels good, denies side effects. Monitoring BP at home, 's-120's and DBP 70's-80's. Will continue current dose, return to the office in November

## 2024-10-09 DIAGNOSIS — I10 PRIMARY HYPERTENSION: Chronic | ICD-10-CM

## 2024-10-10 RX ORDER — LISINOPRIL 10 MG/1
10 TABLET ORAL DAILY
Qty: 30 TABLET | Refills: 0 | Status: SHIPPED | OUTPATIENT
Start: 2024-10-10

## 2024-11-04 ENCOUNTER — OFFICE VISIT (OUTPATIENT)
Dept: FAMILY MEDICINE CLINIC | Facility: CLINIC | Age: 39
End: 2024-11-04
Payer: COMMERCIAL

## 2024-11-04 VITALS
DIASTOLIC BLOOD PRESSURE: 86 MMHG | BODY MASS INDEX: 40.43 KG/M2 | WEIGHT: 315 LBS | HEIGHT: 74 IN | OXYGEN SATURATION: 96 % | SYSTOLIC BLOOD PRESSURE: 132 MMHG | HEART RATE: 93 BPM

## 2024-11-04 DIAGNOSIS — I10 PRIMARY HYPERTENSION: Chronic | ICD-10-CM

## 2024-11-04 PROCEDURE — 99213 OFFICE O/P EST LOW 20 MIN: CPT | Performed by: NURSE PRACTITIONER

## 2024-11-04 RX ORDER — LISINOPRIL 10 MG/1
10 TABLET ORAL DAILY
Qty: 90 TABLET | Refills: 1 | Status: SHIPPED | OUTPATIENT
Start: 2024-11-04

## 2024-11-04 NOTE — PROGRESS NOTES
"    Office Note     Name: Reg Brito    : 1985     MRN: [id number]     Chief Complaint  Hypertension (Pt is here for a recheck on his BP medication. )    Subjective     History of Present Illness:  Reg Brito is a 39 y.o. male who presents today for HTN recheck.    History of Present Illness  The patient presents for evaluation of multiple medical concerns.    He reports overall good health and adherence to his prescribed blood pressure medication. He does not monitor his blood pressure at home. He also mentions the use of Allegra and Nexium, which he obtains over the counter.    He expresses a desire to have his ears cleaned, as he has noticed a buildup of wax. He is on a time-constraint today and needs to leave the office in 10 minutes.      Objective     Past Medical History:   Diagnosis Date    Allergic      Past Surgical History:   Procedure Laterality Date    TONSILLECTOMY       Family History   Problem Relation Age of Onset    Hypertension Mother     Hypertension Father        Vital Signs  /86 (BP Location: Left arm, Patient Position: Sitting, Cuff Size: Large Adult)   Pulse 93   Ht 188 cm (74\")   Wt (!) 184 kg (406 lb)   SpO2 96%   BMI 52.13 kg/m²   Estimated body mass index is 52.13 kg/m² as calculated from the following:    Height as of this encounter: 188 cm (74\").    Weight as of this encounter: 184 kg (406 lb).    Physical Exam  Vitals reviewed.   Constitutional:       Appearance: Normal appearance.   HENT:      Head: Normocephalic.      Right Ear: External ear normal. There is impacted cerumen.      Left Ear: External ear normal. There is impacted cerumen.      Nose: Nose normal.      Mouth/Throat:      Mouth: Mucous membranes are moist.      Pharynx: Oropharynx is clear.   Cardiovascular:      Rate and Rhythm: Normal rate and regular rhythm.      Heart sounds: Normal heart sounds.   Pulmonary:      Effort: Pulmonary effort is normal.      Breath sounds: Normal breath sounds. "   Skin:     General: Skin is warm and dry.      Capillary Refill: Capillary refill takes less than 2 seconds.   Neurological:      General: No focal deficit present.      Mental Status: He is alert and oriented to person, place, and time.   Psychiatric:         Mood and Affect: Mood normal.         Behavior: Behavior normal.        Physical Exam  Vital Signs  Blood pressure reading is 132/86.    Results         Assessment and Plan     Diagnoses and all orders for this visit:    1. Primary hypertension  -     lisinopril (PRINIVIL,ZESTRIL) 10 MG tablet; Take 1 tablet by mouth Daily.  Dispense: 90 tablet; Refill: 1      Assessment & Plan  1. Hypertension.  His blood pressure readings are, technically, within the normal range, although not ideal. This does indicate effective management of his hypertension. A 90-day refill of his current antihypertensive medication will be sent to [facility] home delivery pharmacy. His next annual physical is scheduled for April 2025, during which fasting labs will be conducted. Should his blood pressure remain stable on the current medication, a 1-year supply of the antihypertensive medication will be provided.    2. Cerumen impaction.  Both ears are occluded with dried cerumen. He is advised to schedule an appointment for ear irrigation at a later date, since he is unable to stay for cleaning procedure today.        Follow Up  Return in about 26 weeks (around 5/5/2025) for Annual physical.      Patient or patient representative verbalized consent for the use of Ambient Listening during the visit with  PAULINO Ribera for chart documentation. 11/4/2024  16:02 EST    PAULINO Ribera

## 2024-12-27 ENCOUNTER — TELEPHONE (OUTPATIENT)
Dept: FAMILY MEDICINE CLINIC | Facility: CLINIC | Age: 39
End: 2024-12-27
Payer: COMMERCIAL

## 2024-12-27 NOTE — TELEPHONE ENCOUNTER
PATIENT CALLED UPSET BECAUSE HE COULD NOT GET IN AND STATES WE FAIL TO PROVIDE HIM THE CARE HE NEEDS. THE CALL CAME IN AT 3:15 I TOLD HIM WE CLOSED AT 4 AND OFFERED HIM A FUTURE MAGDALENA AND HE TOLD ME TO INFORM JESSI SHE LOST A PATIENT. HE WOULD HAVE TO FIND ANOTHER PROVIDER.

## 2025-04-02 DIAGNOSIS — I10 PRIMARY HYPERTENSION: Chronic | ICD-10-CM

## 2025-04-03 RX ORDER — LISINOPRIL 10 MG/1
10 TABLET ORAL DAILY
Qty: 90 TABLET | Refills: 3 | OUTPATIENT
Start: 2025-04-03